# Patient Record
Sex: MALE | Race: BLACK OR AFRICAN AMERICAN | URBAN - METROPOLITAN AREA
[De-identification: names, ages, dates, MRNs, and addresses within clinical notes are randomized per-mention and may not be internally consistent; named-entity substitution may affect disease eponyms.]

---

## 2017-04-18 ENCOUNTER — OFFICE VISIT (OUTPATIENT)
Dept: CARDIOLOGY | Facility: CLINIC | Age: 67
End: 2017-04-18
Attending: INTERNAL MEDICINE
Payer: COMMERCIAL

## 2017-04-18 VITALS
HEART RATE: 82 BPM | OXYGEN SATURATION: 98 % | HEIGHT: 69 IN | WEIGHT: 188.3 LBS | SYSTOLIC BLOOD PRESSURE: 120 MMHG | BODY MASS INDEX: 27.89 KG/M2 | DIASTOLIC BLOOD PRESSURE: 70 MMHG

## 2017-04-18 DIAGNOSIS — Z98.61 POSTSURGICAL PERCUTANEOUS TRANSLUMINAL CORONARY ANGIOPLASTY STATUS: Primary | ICD-10-CM

## 2017-04-18 PROCEDURE — 99214 OFFICE O/P EST MOD 30 MIN: CPT | Mod: ZP | Performed by: INTERNAL MEDICINE

## 2017-04-18 PROCEDURE — 99215 OFFICE O/P EST HI 40 MIN: CPT | Mod: ZF

## 2017-04-18 RX ORDER — SULFAMETHOXAZOLE/TRIMETHOPRIM 800-160 MG
1 TABLET ORAL
COMMUNITY
Start: 2017-04-17 | End: 2017-04-22

## 2017-04-18 RX ORDER — FLUTICASONE PROPIONATE 50 MCG
2 SPRAY, SUSPENSION (ML) NASAL
COMMUNITY
Start: 2017-04-13

## 2017-04-18 RX ORDER — VALACYCLOVIR HYDROCHLORIDE 1 G/1
2 TABLET, FILM COATED ORAL
COMMUNITY
Start: 2017-04-07

## 2017-04-18 RX ORDER — BLOOD-GLUCOSE METER
KIT MISCELLANEOUS
COMMUNITY
Start: 2017-04-17

## 2017-04-18 RX ORDER — DIPHENOXYLATE HYDROCHLORIDE AND ATROPINE SULFATE 2.5; .025 MG/1; MG/1
1 TABLET ORAL
COMMUNITY
Start: 2017-04-05

## 2017-04-18 RX ORDER — METFORMIN HCL 500 MG
500 TABLET, EXTENDED RELEASE 24 HR ORAL
COMMUNITY
Start: 2017-04-17

## 2017-04-18 RX ORDER — LATANOPROST 50 UG/ML
1 SOLUTION/ DROPS OPHTHALMIC
COMMUNITY
Start: 2017-04-05

## 2017-04-18 ASSESSMENT — PAIN SCALES - GENERAL: PAINLEVEL: NO PAIN (0)

## 2017-04-18 NOTE — LETTER
Date:April 19, 2017      Patient was self referred, no letter generated. Do not send.        [facility] Health Information

## 2017-04-18 NOTE — NURSING NOTE
Med Reconcile: Reviewed and verified all current medications with the patient. The updated medication list was printed and given to the patient.  Return Appointment: PRN. Patient given instructions regarding scheduling next clinic visit. Patient demonstrated understanding of this information and agreed to call with further questions or concerns.  Patient stated he understood all health information given and agreed to call with further questions or concerns.  Medication Change: Discontinue Plavix.  Patient was educated regarding prescribed medication change, including discussion of the indication, administration, side effects, and when to report to MD or RN. Patient demonstrated understanding of this information and agreed to call with further questions or concerns.

## 2017-04-18 NOTE — MR AVS SNAPSHOT
"              After Visit Summary   4/18/2017    Grace Faulkner    MRN: [id number]           Patient Information     Date Of Birth          1950        Visit Information        Provider Department      4/18/2017 10:30 AM PALLAVI Vu MD [facility]        Care Instructions    Please stop taking Plavix when your supply runs out.    Thank you for your visit today.  Please call me with any questions or concerns.   Chepe Ramires RN  Cardiology Care Coordinator  [phone], press option [phone] then option 3        Follow-ups after your visit        Follow-up notes from your care team     Return if symptoms worsen or fail to improve.      Who to contact     If you have questions or need follow up information about today's clinic visit or your schedule please contact [facility] directly at [phone].  Normal or non-critical lab and imaging results will be communicated to you by Assmblyhart, letter or phone within 4 business days after the clinic has received the results. If you do not hear from us within 7 days, please contact the clinic through Assmblyhart or phone. If you have a critical or abnormal lab result, we will notify you by phone as soon as possible.  Submit refill requests through Modern Message or call your pharmacy and they will forward the refill request to us. Please allow 3 business days for your refill to be completed.          Additional Information About Your Visit        Assmblyhart Information     Modern Message lets you send messages to your doctor, view your test results, renew your prescriptions, schedule appointments and more. To sign up, go to [email or web address] . Click on \"Log in\" on the left side of the screen, which will take you to the Welcome page. Then click on \"Sign up Now\" on the right side of the page.     You will be asked to enter the access code listed below, as well as some personal information. Please follow the directions to create your username and password.     Your " "access code is: [email or web address]  Expires: 2017  6:30 AM     Your access code will  in 90 days. If you need help or a new code, please call your [facility] or [phone].        Care EveryWhere ID     This is your Care EveryWhere ID. This could be used by other organizations to access your [place] medical records  [id number]        Your Vitals Were     Pulse Height Pulse Oximetry BMI (Body Mass Index)          82 1.753 m (5' 9\") 98% 27.81 kg/m2         Blood Pressure from Last 3 Encounters:   17 120/70   03/10/16 137/82   12/23/15 137/73    Weight from Last 3 Encounters:   17 85.4 kg (188 lb 4.8 oz)   03/10/16 80.9 kg (178 lb 4.8 oz)   12/30/15 88.9 kg (196 lb)              Today, you had the following     No orders found for display       Primary Care Provider    None       No address on file        Thank you!     Thank you for choosing [facility]  for your care. Our goal is always to provide you with excellent care. Hearing back from our patients is one way we can continue to improve our services. Please take a few minutes to complete the written survey that you may receive in the mail after your visit with us. Thank you!             Your Updated Medication List - Protect others around you: Learn how to safely use, store and throw away your medicines at www.disposemymeds.org.          This list is accurate as of: 17 10:57 AM.  Always use your most recent med list.                   Brand Name Dispense Instructions for use    alum & mag hydroxide-simethicone 400-400-40 MG/5ML Susp suspension    MYLANTA ES/MAALOX  ES    2000 mL    Take 15-30 mLs by mouth every 4 hours as needed for indigestion or heartburn       aspirin 81 MG EC tablet     30 tablet    Take 1 tablet (81 mg) by mouth daily       atorvastatin 80 MG tablet    LIPITOR    30 tablet    Take 1 tablet (80 mg) by mouth daily       blood glucose monitoring meter device kit      Dispense meter, test strips, lancets " covered by pt ins. E11.9 NIDDM type II - Test 2 times/day. Reason: New diabetes       fluticasone 50 MCG/ACT spray    FLONASE     Spray 2 sprays in nostril       isosorbide mononitrate 30 MG 24 hr tablet    IMDUR    30 tablet    Take 1 tablet (30 mg) by mouth daily Please do not take if systolic blood pressure less than 110 or feeling dizzy       latanoprost 0.005 % ophthalmic solution    XALATAN     1 drop       metFORMIN 500 MG 24 hr tablet    GLUCOPHAGE-XR     Take 500 mg by mouth       MULTI-VITAMINS Tabs      Take 1 tablet by mouth       nitroglycerin 0.4 MG sublingual tablet    NITROSTAT    25 tablet    Place 1 tablet (0.4 mg) under the tongue every 5 minutes as needed for chest pain if you are still having symptoms after 3 doses (15 minutes) call 911.       pantoprazole 40 MG EC tablet    PROTONIX    30 tablet    Take 1 tablet (40 mg) by mouth every morning       SOYBEAN PO          sulfamethoxazole-trimethoprim 800-160 MG per tablet    BACTRIM DS/SEPTRA DS     Take 1 tablet by mouth       valACYclovir 1000 mg tablet    VALTREX     Take 2 g by mouth

## 2017-04-18 NOTE — PROGRESS NOTES
SUBJECTIVE:  Grace Faulkner is a 66 year old male who presents for follow up.  Had stent to OM1 and distal LCx on 12/22/2015. Remain asymptomatic.  HLD+.Recently diagnosed DM2.  Planning for prostate Biopsy as per daughter.    There are no active problems to display for this patient.   .  Current Outpatient Prescriptions   Medication Sig     blood glucose monitoring ([employer] GLUCOSE MONITOR) meter device kit Dispense meter, test strips, lancets covered by pt ins. E11.9 NIDDM type II - Test 2 times/day. Reason: New diabetes     SOYBEAN PO      fluticasone (FLONASE) 50 MCG/ACT spray Spray 2 sprays in nostril     latanoprost (XALATAN) 0.005 % ophthalmic solution 1 drop     metFORMIN (GLUCOPHAGE-XR) 500 MG 24 hr tablet Take 500 mg by mouth     Multiple Vitamin (MULTI-VITAMINS) TABS Take 1 tablet by mouth     sulfamethoxazole-trimethoprim (BACTRIM DS/SEPTRA DS) 800-160 MG per tablet Take 1 tablet by mouth     valACYclovir (VALTREX) 1000 mg tablet Take 2 g by mouth     isosorbide mononitrate (IMDUR) 30 MG 24 hr tablet Take 1 tablet (30 mg) by mouth daily Please do not take if systolic blood pressure less than 110 or feeling dizzy     nitroglycerin (NITROSTAT) 0.4 MG SL tablet Place 1 tablet (0.4 mg) under the tongue every 5 minutes as needed for chest pain if you are still having symptoms after 3 doses (15 minutes) call 911.     atorvastatin (LIPITOR) 80 MG tablet Take 1 tablet (80 mg) by mouth daily     alum & mag hydroxide-simethicone (MYLANTA ES/MAALOX  ES) 400-400-40 MG/5ML SUSP Take 15-30 mLs by mouth every 4 hours as needed for indigestion or heartburn     pantoprazole (PROTONIX) 40 MG enteric coated tablet Take 1 tablet (40 mg) by mouth every morning     aspirin 81 MG EC tablet Take 1 tablet (81 mg) by mouth daily     No current facility-administered medications for this visit.      Past Medical History:   Diagnosis Date     Glaucoma      Hyperlipidemia      No past surgical history on file.  Allergies  "  Allergen Reactions     Diphenhydramine Other (See Comments)     Passed out, has tried in the past but had heart surgery 1 year ago     Social History     Social History     Marital status:      Spouse name: N/A     Number of children: N/A     Years of education: N/A     Occupational History     Not on file.     Social History Main Topics     Smoking status: Never Smoker     Smokeless tobacco: Not on file     Alcohol use Not on file     Drug use: Not on file     Sexual activity: Not on file     Other Topics Concern     Not on file     Social History Narrative     No family history on file.       REVIEW OF SYSTEMS:  General: negative, fever, chills, night sweats  Skin: negative, acne, rash and scaling  Eyes: negative, double vision and photophobia  Ears/Nose/Throat: negative, nasal congestion and purulent rhinorrhea  Respiratory: No dyspnea on exertion, No cough, No hemoptysis and negative  Cardiovascular: negative, palpitations, tachycardia, irregular heart beat, chest pain, exertional chest pain or pressure, paroxysmal nocturnal dyspnea, dyspnea on exertion and orthopnea         OBJECTIVE:  Blood pressure 120/70, pulse 82, height 1.753 m (5' 9\"), weight 85.4 kg (188 lb 4.8 oz), SpO2 98 %.  General Appearance: alert, active and no distress  Head: Normocephalic. No masses, lesions, tenderness or abnormalities  Eyes: conjuctiva clear, PERRL, EOM intact  Ears: External ears normal. Canals clear. TM's normal.  Nose: Nares normal  Mouth: normal  Neck: Supple, no cervical adenopathy, no thyromegaly  Lungs: clear to auscultation  Cardiac: regular rate and rhythm, normal S1 and S2, no murmur         ASSESSMENT/PLAN:  S/P PCI/Stent to OM1 nd distal LCx on 12/22/2015.  Remain active and asymptomatic.  Completed over 1yr of Plavix and this medication can be discontinued.  As asymptomatic no cardiac evaluation needed at this time.  Patient may proceed with planned prostate biopsy as his Plavix can be discontinued.  Per " orders.   Return to Clinic PRN.

## 2017-04-18 NOTE — NURSING NOTE
Chief Complaint   Patient presents with     Follow Up For     Follow up post coronary angioplasty

## 2017-04-18 NOTE — PATIENT INSTRUCTIONS
Please stop taking Plavix when your supply runs out.    Thank you for your visit today.  Please call me with any questions or concerns.   Chepe Ramires RN  Cardiology Care Coordinator  [phone], press option 1 then option 3

## 2017-04-18 NOTE — LETTER
4/18/2017      RE: Grace Faulkner  [address] [facility] [address]       Dear Colleague,    Thank you for the opportunity to participate in the care of your patient, Grace Faulkner, at the [facility]. Please see a copy of my visit note below.       SUBJECTIVE:  Grace Faulkner is a 66 year old male who presents for follow up.  Had stent to OM1 and distal LCx on 12/22/2015. Remain asymptomatic.  HLD+.Recently diagnosed DM2.  Planning for prostate Biopsy as per daughter.    There are no active problems to display for this patient.   .  Current Outpatient Prescriptions   Medication Sig     blood glucose monitoring ([employer] GLUCOSE MONITOR) meter device kit Dispense meter, test strips, lancets covered by pt ins. E11.9 NIDDM type II - Test 2 times/day. Reason: New diabetes     SOYBEAN PO      fluticasone (FLONASE) 50 MCG/ACT spray Spray 2 sprays in nostril     latanoprost (XALATAN) 0.005 % ophthalmic solution 1 drop     metFORMIN (GLUCOPHAGE-XR) 500 MG 24 hr tablet Take 500 mg by mouth     Multiple Vitamin (MULTI-VITAMINS) TABS Take 1 tablet by mouth     sulfamethoxazole-trimethoprim (BACTRIM DS/SEPTRA DS) 800-160 MG per tablet Take 1 tablet by mouth     valACYclovir (VALTREX) 1000 mg tablet Take 2 g by mouth     isosorbide mononitrate (IMDUR) 30 MG 24 hr tablet Take 1 tablet (30 mg) by mouth daily Please do not take if systolic blood pressure less than 110 or feeling dizzy     nitroglycerin (NITROSTAT) 0.4 MG SL tablet Place 1 tablet (0.4 mg) under the tongue every 5 minutes as needed for chest pain if you are still having symptoms after 3 doses (15 minutes) call 911.     atorvastatin (LIPITOR) 80 MG tablet Take 1 tablet (80 mg) by mouth daily     alum & mag hydroxide-simethicone (MYLANTA ES/MAALOX  ES) 400-400-40 MG/5ML SUSP Take 15-30 mLs by mouth every 4 hours as needed for indigestion or heartburn     pantoprazole (PROTONIX) 40 MG enteric  "coated tablet Take 1 tablet (40 mg) by mouth every morning     aspirin 81 MG EC tablet Take 1 tablet (81 mg) by mouth daily     No current facility-administered medications for this visit.      Past Medical History:   Diagnosis Date     Glaucoma      Hyperlipidemia      No past surgical history on file.  Allergies   Allergen Reactions     Diphenhydramine Other (See Comments)     Passed out, has tried in the past but had heart surgery 1 year ago     Social History     Social History     Marital status:      Spouse name: N/A     Number of children: N/A     Years of education: N/A     Occupational History     Not on file.     Social History Main Topics     Smoking status: Never Smoker     Smokeless tobacco: Not on file     Alcohol use Not on file     Drug use: Not on file     Sexual activity: Not on file     Other Topics Concern     Not on file     Social History Narrative     No family history on file.       REVIEW OF SYSTEMS:  General: negative, fever, chills, night sweats  Skin: negative, acne, rash and scaling  Eyes: negative, double vision and photophobia  Ears/Nose/Throat: negative, nasal congestion and purulent rhinorrhea  Respiratory: No dyspnea on exertion, No cough, No hemoptysis and negative  Cardiovascular: negative, palpitations, tachycardia, irregular heart beat, chest pain, exertional chest pain or pressure, paroxysmal nocturnal dyspnea, dyspnea on exertion and orthopnea         OBJECTIVE:  Blood pressure 120/70, pulse 82, height 1.753 m (5' 9\"), weight 85.4 kg (188 lb 4.8 oz), SpO2 98 %.  General Appearance: alert, active and no distress  Head: Normocephalic. No masses, lesions, tenderness or abnormalities  Eyes: conjuctiva clear, PERRL, EOM intact  Ears: External ears normal. Canals clear. TM's normal.  Nose: Nares normal  Mouth: normal  Neck: Supple, no cervical adenopathy, no thyromegaly  Lungs: clear to auscultation  Cardiac: regular rate and rhythm, normal S1 and S2, no murmur         " ASSESSMENT/PLAN:  S/P PCI/Stent to OM1 nd distal LCx on 12/22/2015.  Remain active and asymptomatic.  Completed over 1yr of Plavix and this medication can be discontinued.  As asymptomatic no cardiac evaluation needed at this time.  Patient may proceed with planned prostate biopsy as his Plavix can be discontinued.  Per orders.   Return to Clinic PRN.    Please do not hesitate to contact me if you have any questions/concerns.     Sincerely,     PALLAVI Vu MD

## 2017-05-09 ENCOUNTER — TELEPHONE (OUTPATIENT)
Dept: CARDIOLOGY | Facility: CLINIC | Age: 67
End: 2017-05-09

## 2017-05-10 ENCOUNTER — CARE COORDINATION (OUTPATIENT)
Dept: CARDIOLOGY | Facility: CLINIC | Age: 67
End: 2017-05-10

## 2017-05-10 DIAGNOSIS — I25.10 CAD (CORONARY ARTERY DISEASE): Primary | ICD-10-CM

## 2017-05-10 RX ORDER — ISOSORBIDE MONONITRATE 30 MG/1
30 TABLET, EXTENDED RELEASE ORAL DAILY
Qty: 30 TABLET | Refills: 12 | Status: SHIPPED | OUTPATIENT
Start: 2017-05-10 | End: 2018-06-07

## 2017-05-10 NOTE — TELEPHONE ENCOUNTER
- Received a pager on [phone], from Pharmacy about pt being out of meds.  - Called back at [phone], however the Pharmacy had closed and I was not able to find out which medications he was out of.  - Left a message for the Pharmacy to call me back so that the medications can be re-ordered.      Phillip Thakur MD  Cardiovascular Disease Fellow  Pager: [phone]

## 2018-06-07 DIAGNOSIS — I25.10 CAD (CORONARY ARTERY DISEASE): ICD-10-CM

## 2018-06-08 RX ORDER — ISOSORBIDE MONONITRATE 30 MG/1
TABLET, EXTENDED RELEASE ORAL
Qty: 90 TABLET | Refills: 3 | Status: SHIPPED | OUTPATIENT
Start: 2018-06-08